# Patient Record
Sex: MALE | Race: OTHER | Employment: FULL TIME | URBAN - METROPOLITAN AREA
[De-identification: names, ages, dates, MRNs, and addresses within clinical notes are randomized per-mention and may not be internally consistent; named-entity substitution may affect disease eponyms.]

---

## 2022-07-08 ENCOUNTER — OFFICE VISIT (OUTPATIENT)
Dept: CARDIOLOGY CLINIC | Age: 47
End: 2022-07-08

## 2022-07-08 VITALS
DIASTOLIC BLOOD PRESSURE: 96 MMHG | BODY MASS INDEX: 34.36 KG/M2 | HEIGHT: 69 IN | SYSTOLIC BLOOD PRESSURE: 159 MMHG | HEART RATE: 64 BPM | WEIGHT: 232 LBS

## 2022-07-08 DIAGNOSIS — I10 PRIMARY HYPERTENSION: Primary | ICD-10-CM

## 2022-07-08 DIAGNOSIS — R94.31 ABNORMAL EKG: ICD-10-CM

## 2022-07-08 PROCEDURE — 99203 OFFICE O/P NEW LOW 30 MIN: CPT | Performed by: INTERNAL MEDICINE

## 2022-07-08 PROCEDURE — 93000 ELECTROCARDIOGRAM COMPLETE: CPT | Performed by: INTERNAL MEDICINE

## 2022-07-08 RX ORDER — LOSARTAN POTASSIUM 50 MG/1
50 TABLET ORAL DAILY
COMMUNITY
End: 2022-08-29 | Stop reason: ALTCHOICE

## 2022-07-08 ASSESSMENT — ENCOUNTER SYMPTOMS
VOMITING: 0
DOUBLE VISION: 0
SHORTNESS OF BREATH: 0
NAUSEA: 0
BACK PAIN: 0
ABDOMINAL PAIN: 0
COUGH: 0
ORTHOPNEA: 0
BLOATING: 0
BLURRED VISION: 0
HEMOPTYSIS: 0

## 2022-08-29 ENCOUNTER — OFFICE VISIT (OUTPATIENT)
Dept: CARDIOLOGY CLINIC | Age: 47
End: 2022-08-29

## 2022-08-29 VITALS
DIASTOLIC BLOOD PRESSURE: 86 MMHG | WEIGHT: 232.4 LBS | HEIGHT: 69 IN | BODY MASS INDEX: 34.42 KG/M2 | HEART RATE: 72 BPM | SYSTOLIC BLOOD PRESSURE: 132 MMHG

## 2022-08-29 DIAGNOSIS — I10 PRIMARY HYPERTENSION: Primary | ICD-10-CM

## 2022-08-29 DIAGNOSIS — R94.31 ABNORMAL EKG: ICD-10-CM

## 2022-08-29 PROCEDURE — 99213 OFFICE O/P EST LOW 20 MIN: CPT | Performed by: INTERNAL MEDICINE

## 2022-08-29 ASSESSMENT — ENCOUNTER SYMPTOMS
NAUSEA: 0
BLOATING: 0
ORTHOPNEA: 0
VOMITING: 0
SHORTNESS OF BREATH: 0
DOUBLE VISION: 0
BACK PAIN: 0
ABDOMINAL PAIN: 0
BLURRED VISION: 0
HEMOPTYSIS: 0
COUGH: 0

## 2022-08-29 NOTE — PROGRESS NOTES
[facility] CARDIOLOGY  [id number], [address]  PHONE: [phone]    22    NAME:  Tara Soliman  : 1975  MRN: [id number]         SUBJECTIVE:   Tara Soliman is a 52 y.o. male seen for a visit regarding the following:     Chief Complaint   Patient presents with    Hypertension     6 week follow up    Results     Echo results       HPI:      No prior cardiac hx. Hx of HTN (been on losartan but doesn't use it daily; ~4-5 years). Echo with preserved EF and normal diastolic function [no wall motion abnormality; 22]. Can walk over a couple miles with no significant issues. Some dyspnea on exertion mostly when it is hot outside. Denies any chest discomfort. No PND/orthopnea. Was on losartan 50 mg daily previously which he is currently off. Home logs reviewed from the last couple months off therapy and well-controlled. States has seen a cardiologist about 10 years ago and appears underwent a stress test at the time. No records available. Elevated blood pressures-controlled, abnormal EKG-stable, dyspnea on exertion-improved    Past Medical History, Past Surgical History, Family history, Social History, and Medications were all reviewed with the patient today and updated as necessary. No Known Allergies  There is no problem list on file for this patient. Past Medical History:   Diagnosis Date    Hypertension      No past surgical history on file. Family History   Problem Relation Age of Onset    Heart Surgery Neg Hx     Heart Attack Neg Hx      Social History     Tobacco Use    Smoking status: Never    Smokeless tobacco: Never   Substance Use Topics    Alcohol use: Never     No current outpatient medications on file. No current facility-administered medications for this visit. Review of Systems   Constitutional: Negative for chills, decreased appetite, fever, malaise/fatigue and weight gain.    HENT:  Negative for nosebleeds. Eyes:  Negative for blurred vision and double vision. Cardiovascular:  Negative for chest pain, claudication, dyspnea on exertion, leg swelling, orthopnea, palpitations, paroxysmal nocturnal dyspnea and syncope. Respiratory:  Negative for cough, hemoptysis and shortness of breath. Endocrine: Negative for cold intolerance and heat intolerance. Hematologic/Lymphatic: Negative for bleeding problem. Skin:  Negative for rash. Musculoskeletal:  Negative for back pain, joint pain, muscle weakness and myalgias. Gastrointestinal:  Negative for bloating, abdominal pain, nausea and vomiting. Genitourinary:  Negative for dysuria. Neurological:  Negative for dizziness, light-headedness and weakness. Psychiatric/Behavioral:  Negative for altered mental status. PHYSICAL EXAM:    /86   Pulse 72   Ht 5' 9\" (1.753 m)   Wt 232 lb 6.4 oz (105.4 kg)   BMI 34.32 kg/m²      Physical Exam  Constitutional:       Appearance: Normal appearance. HENT:      Head: Normocephalic and atraumatic. Mouth/Throat:      Mouth: Mucous membranes are moist.   Eyes:      Pupils: Pupils are equal, round, and reactive to light. Cardiovascular:      Rate and Rhythm: Normal rate and regular rhythm. Pulses: Normal pulses. Pulmonary:      Effort: Pulmonary effort is normal.      Breath sounds: Normal breath sounds. Abdominal:      General: Bowel sounds are normal. There is no distension. Palpations: Abdomen is soft. Tenderness: There is no abdominal tenderness. Musculoskeletal:         General: No swelling. Cervical back: Normal range of motion. Skin:     General: Skin is warm and dry. Neurological:      General: No focal deficit present. Mental Status: He is alert and oriented to person, place, and time. Medical problems and test results were reviewed with the patient today. No results found for this or any previous visit (from the past 672 hour(s)).   No results found for: CHOL, CHOLPOCT, CHOLX, CHLST, CHOLV, HDL, HDLPOC, HDLC, LDL, LDLC, VLDLC, VLDL, TGLX, TRIGL    No results found for any visits on 08/29/22. ASSESSMENT and Tana Salinas was seen today for hypertension and results. Diagnoses and all orders for this visit:    Primary hypertension    Abnormal EKG      Overall Impression    Hypertension-controlled. Target less than 140/90. Home logs reviewed and well controlled and less than 130/80. Has been off losartan for couple months and okay to stay off. Well-controlled off therapy. Continued diet/exercise. Abnormal EKG-inferior Q's noted. Echocardiogram unremarkable. Return if symptoms worsen or fail to improve.      Michael Kowalski MD  8/29/2022  8:36 AM

## 2023-03-20 ENCOUNTER — TELEPHONE (OUTPATIENT)
Dept: CARDIOLOGY CLINIC | Age: 48
End: 2023-03-20

## 2023-03-20 NOTE — TELEPHONE ENCOUNTER
Pt needs his records faxed over to [facility] of [facility].     Fax: [phone]

## 2023-03-20 NOTE — TELEPHONE ENCOUNTER
Faxed records to [facility] at [facility] [phone]